# Patient Record
Sex: FEMALE | Race: WHITE | Employment: FULL TIME | URBAN - NONMETROPOLITAN AREA
[De-identification: names, ages, dates, MRNs, and addresses within clinical notes are randomized per-mention and may not be internally consistent; named-entity substitution may affect disease eponyms.]

---

## 2018-03-21 ENCOUNTER — HOSPITAL ENCOUNTER (EMERGENCY)
Age: 23
Discharge: HOME | End: 2018-03-22
Attending: FAMILY MEDICINE
Payer: COMMERCIAL

## 2018-03-21 VITALS
TEMPERATURE: 98.1 F | HEART RATE: 104 BPM | DIASTOLIC BLOOD PRESSURE: 92 MMHG | SYSTOLIC BLOOD PRESSURE: 137 MMHG | OXYGEN SATURATION: 98 % | RESPIRATION RATE: 20 BRPM

## 2018-03-21 DIAGNOSIS — S00.83XA FACIAL CONTUSION, INITIAL ENCOUNTER: ICD-10-CM
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
## 2018-03-21 DIAGNOSIS — S09.93XA DENTAL INJURY, INITIAL ENCOUNTER: ICD-10-CM

## 2018-03-21 DIAGNOSIS — J32.4 CHRONIC PANSINUSITIS: ICD-10-CM

## 2018-03-21 PROCEDURE — 99283 EMERGENCY DEPT VISIT LOW MDM: CPT

## 2018-03-22 ENCOUNTER — APPOINTMENT (OUTPATIENT)
Dept: CT IMAGING | Age: 23
End: 2018-03-22
Payer: COMMERCIAL

## 2018-03-22 PROCEDURE — 70486 CT MAXILLOFACIAL W/O DYE: CPT

## 2018-03-22 RX ORDER — DOXYCYCLINE HYCLATE 100 MG
100 TABLET ORAL 2 TIMES DAILY
Qty: 20 TABLET | Refills: 0 | Status: SHIPPED | OUTPATIENT
Start: 2018-03-22 | End: 2018-04-01

## 2018-03-22 ASSESSMENT — PAIN SCALES - GENERAL: PAINLEVEL_OUTOF10: 8

## 2018-03-22 ASSESSMENT — ENCOUNTER SYMPTOMS
VOMITING: 0
SHORTNESS OF BREATH: 0
NAUSEA: 0

## 2018-03-22 ASSESSMENT — PAIN DESCRIPTION - PAIN TYPE: TYPE: ACUTE PAIN

## 2018-03-22 ASSESSMENT — PAIN DESCRIPTION - LOCATION: LOCATION: FACE

## 2018-03-22 NOTE — ED NOTES
Pt fell off of barstool at a bar and hit her face on the ground. Pt top front 4 teeth are chipped.      Nain Madden RN  03/22/18 7424

## 2018-03-22 NOTE — ED PROVIDER NOTES
history is not on file. SOCIAL HISTORY          PHYSICAL EXAM     INITIAL VITALS:  oral temperature is 98.1 °F (36.7 °C). Her blood pressure is 137/92 (abnormal) and her pulse is 104. Her respiration is 20 and oxygen saturation is 98%. Physical Exam   Constitutional: She is oriented to person, place, and time. She appears well-developed and well-nourished. HENT:   Head: Normocephalic. Maxillary central incisors are chipped    Mild gingival abrasion right maxillary central incisor    The teeth are not loose    Lips and tongue normal    Ear canals clear TMs normal    No nasal bone tenderness     Eyes: Conjunctivae are normal. Pupils are equal, round, and reactive to light. Neck: Normal range of motion. Neck supple. No neck tenderness   Cardiovascular: Normal rate and regular rhythm. Pulmonary/Chest: Effort normal and breath sounds normal.   Musculoskeletal: Normal range of motion. She exhibits no edema or tenderness. Neurological: She is alert and oriented to person, place, and time. She exhibits normal muscle tone. Skin: Skin is warm and dry. Psychiatric: She has a normal mood and affect. Her behavior is normal.   Nursing note and vitals reviewed. DIFFERENTIAL DIAGNOSIS:   Fall with facial contusions, chipped teeth    Check for facial fracture    DIAGNOSTIC RESULTS           RADIOLOGY: non-plain film images(s) such as CT, Ultrasound and MRI are read by the radiologist.  The patient had a Multiple view CT scan of the facial bones which demonstrates Pansinusitis, no definite facial fracture per my interpretation    [x] Visualized and interpreted by me   [] Radiologist's Report Reviewed   [] Discussed with Radiologist.      LABS:   Labs Reviewed - No data to display    EMERGENCY DEPARTMENT COURSE:   Vitals:    Vitals:    03/21/18 2350   BP: (!) 137/92   Pulse: 104   Resp: 20   Temp: 98.1 °F (36.7 °C)   TempSrc: Oral   SpO2: 98%     11:59 PM: The patient was seen and evaluated.     Nursing